# Patient Record
Sex: MALE | Race: OTHER | NOT HISPANIC OR LATINO
[De-identification: names, ages, dates, MRNs, and addresses within clinical notes are randomized per-mention and may not be internally consistent; named-entity substitution may affect disease eponyms.]

---

## 2017-02-12 PROBLEM — Z00.00 ENCOUNTER FOR PREVENTIVE HEALTH EXAMINATION: Status: ACTIVE | Noted: 2017-02-12

## 2017-02-13 ENCOUNTER — APPOINTMENT (OUTPATIENT)
Dept: HUMAN REPRODUCTION | Facility: CLINIC | Age: 32
End: 2017-02-13

## 2017-11-06 ENCOUNTER — EMERGENCY (EMERGENCY)
Facility: HOSPITAL | Age: 32
LOS: 1 days | Discharge: HOME | End: 2017-11-06
Attending: EMERGENCY MEDICINE | Admitting: EMERGENCY MEDICINE
Payer: SELF-PAY

## 2017-11-06 VITALS
SYSTOLIC BLOOD PRESSURE: 134 MMHG | RESPIRATION RATE: 18 BRPM | OXYGEN SATURATION: 98 % | HEART RATE: 70 BPM | DIASTOLIC BLOOD PRESSURE: 108 MMHG

## 2017-11-06 PROCEDURE — 99284 EMERGENCY DEPT VISIT MOD MDM: CPT | Mod: 25

## 2017-11-06 PROCEDURE — 99053 MED SERV 10PM-8AM 24 HR FAC: CPT

## 2017-11-06 PROCEDURE — 73564 X-RAY EXAM KNEE 4 OR MORE: CPT | Mod: 26,LT

## 2017-11-06 RX ORDER — ACETAMINOPHEN 500 MG
975 TABLET ORAL ONCE
Qty: 0 | Refills: 0 | Status: COMPLETED | OUTPATIENT
Start: 2017-11-06 | End: 2017-11-06

## 2017-11-06 RX ORDER — MORPHINE SULFATE 50 MG/1
4 CAPSULE, EXTENDED RELEASE ORAL ONCE
Qty: 0 | Refills: 0 | Status: DISCONTINUED | OUTPATIENT
Start: 2017-11-06 | End: 2017-11-06

## 2017-11-06 RX ORDER — SODIUM CHLORIDE 9 MG/ML
3 INJECTION INTRAMUSCULAR; INTRAVENOUS; SUBCUTANEOUS EVERY 8 HOURS
Qty: 0 | Refills: 0 | Status: DISCONTINUED | OUTPATIENT
Start: 2017-11-06 | End: 2017-11-11

## 2017-11-06 RX ORDER — OXYCODONE HYDROCHLORIDE 5 MG/1
5 TABLET ORAL ONCE
Qty: 0 | Refills: 0 | Status: DISCONTINUED | OUTPATIENT
Start: 2017-11-06 | End: 2017-11-06

## 2017-11-06 RX ADMIN — OXYCODONE HYDROCHLORIDE 5 MILLIGRAM(S): 5 TABLET ORAL at 22:53

## 2017-11-06 RX ADMIN — Medication 975 MILLIGRAM(S): at 22:53

## 2017-11-06 RX ADMIN — MORPHINE SULFATE 4 MILLIGRAM(S): 50 CAPSULE, EXTENDED RELEASE ORAL at 23:50

## 2017-11-06 NOTE — ED ADULT NURSE NOTE - DISCHARGE TEACHING
pt ambulated in ED with crutches, crutches use educated. splint care educated. pt refused wheelchair out. states he would like to go home and is comfortable with crutches. pt wife driving home. per wife she has people to help her get him out of car and into house. both pt and wife comfortable with discharge and would like to home.

## 2017-11-06 NOTE — ED PROVIDER NOTE - PHYSICAL EXAMINATION
**ATTENDING ADDENDUM (Dr. Moises Lagunas): I have reviewed and substantially contributed to the elements of the PE as documented above. I have directly performed an examination of this patient in conjunction with the other members (EM resident/PA/NP) of the patient care team. Of note, and in addition to the above, there is a good dorsalis pedis pulse in the left lower extremity. NO distal discoloration. POSITIVE tenderness and soft-tissue swelling with fluctuance **ATTENDING ADDENDUM (Dr. Moises Lagunas): I have reviewed and substantially contributed to the elements of the PE as documented above. I have directly performed an examination of this patient in conjunction with the other members (EM resident/PA/NP) of the patient care team. Of note, and in addition to the above, there is a good dorsalis pedis pulse in the left lower extremity. NO distal discoloration. POSITIVE tenderness and soft-tissue swelling with fluctuance. POSITIVE diminished range of motion (unable to flex knee, not able to raise extended leg off bed). NO laxity with valgus stress to joint. NO laxity with varus stress to joint. NO ballotment of patella.

## 2017-11-06 NOTE — ED PROVIDER NOTE - ATTENDING CONTRIBUTION TO CARE
**ATTENDING ADDENDUM (Dr. Moises Lagunas): I attest that I have directly examined this patient and reviewed and formulated the diagnostic and therapeutic management plan in collaboration with the advanced practitioner (NP, PA). Please see MDM note and remainder of EMR for findings from CC, HPI, ROS, and PE. ([place])

## 2017-11-06 NOTE — ED PROCEDURE NOTE - CPROC ED POST PROC CARE GUIDE1
Verbal/written post procedure instructions were given to patient/caregiver. Keep the cast/splint/dressing clean and dry./Verbal/written post procedure instructions were given to patient/caregiver./Instructed patient/caregiver to follow-up with primary care physician./Elevate the injured extremity as instructed.

## 2017-11-06 NOTE — ED PROVIDER NOTE - OBJECTIVE STATEMENT
32 y.o. male coming in with left knee pain.  Pt was playing basketball came down on his knee heard a po and fell to the ground.  Here with pain and inability to range the knee.  No other injury at this time. 32 y.o. male coming in with left knee pain.  Pt was playing basketball came down on his knee heard a po and fell to the ground.  Here with pain and inability to range the knee.  No other injury at this time.  **ATTENDING ADDENDUM (Dr. Moises Lagunas): I attest that I have directly examined this patient and elicited a comparable history of present illness and review of systems with my collaborating provider (NP/PA). I attest that I have made significant contributions to the documentation where necessary and as noted in the EMR. 32 y.o. male coming in with left knee pain.  Pt was playing basketball came down on his knee heard a po and fell to the ground.  Here with pain and inability to range the knee.  No other injury at this time.  **ATTENDING ADDENDUM (Dr. Moises Lagunas): I attest that I have directly examined this patient and elicited a comparable history of present illness and review of systems with my collaborating provider (NP/PA). I attest that I have made significant contributions to the documentation where necessary and as noted in the EMR. Of note, and in addition to the above, patient is a 32-year-old man who sustained injury and felt "pop" in his left knee while playing basketball. POSITIVE fell, without obvious head/neck/chest/abdominal/back trauma, but with inability to walk or weight bear on his left lower extremity. POSITIVE mild soft-tissue swelling; NO numbness, tingling, weakness, or paresthesias. POSITIVE limited range of motion secondary to pain. Here for evaluation. NO medications prior to arrival. VAS 3-4/10 (pain reported as 8-9/10). Wife reports that patient turned pale and got nauseous prior to arrival to ED.

## 2017-11-06 NOTE — ED PROCEDURE NOTE - PROCEDURE ADDITIONAL DETAILS
**ATTENDING ADDENDUM (Dr. Moises Lagunas): I was present during the key portions of the procedure and immediately available during the entire procedure. Agree with plan and management. Anticipatory guidance provided.

## 2017-11-06 NOTE — ED PROVIDER NOTE - EYES, MLM
**ATTENDING ADDENDUM (Dr. Moises Lagunas): Extraocular muscle movements intact. Clear corneas bilaterally, pupils equal and round.

## 2017-11-06 NOTE — ED PROVIDER NOTE - PLAN OF CARE
ATTENDING ADDENDUM (Dr. Moises Lagunas): Goals of care include resolution of emergent/urgent symptoms and concerns, and restoration to baseline level of homeostasis. ATTENDING ADDENDUM (Dr. Moises Lagunas): (1) anticipatory guidance provided  (2) rest  (3) outpatient follow-up with your primary care physician/provider (4) return if symptoms worsen, persist, or do not resolve (5) medications, if indicated, as prescribed (oxycodone, acetaminophen as directed) ATTENDING ADDENDUM (Dr. Moises Lagunas): (1) anticipatory guidance provided  (2) rest  (3) outpatient follow-up with your primary care physician/provider (4) return if symptoms worsen, persist, or do not resolve (5) medications, if indicated, as prescribed (oxycodone, acetaminophen as directed) (6) followup with orthopedics as directed

## 2017-11-06 NOTE — ED PROVIDER NOTE - PROGRESS NOTE DETAILS
**ATTENDING ADDENDUM (Dr. Moises Lagunas): patient serially evaluated throughout ED course. NO acute deterioration up to this time in the ED. XRs reviewed: POSITIVE tibial plateau fracture, not quadriceps tendon or patellar ligament injury/rupture, as initially suspected. Case reviewed with orthopedic resident. Patient will need referral to orthopedics for elective repair once soft-tissue swelling diminishes. Agrees with discharge home with close outpatient followup with orthopedics, crutches and non-weight bearing, analgesics, and bulky Kiser dressing with knee immobilizer. Anticipatory guidance provided. Will continue to observe and monitor closely. **ATTENDING ADDENDUM (Dr. Moises Lagunas): will place IV for analgesic administration for splinting (bulky Kiser dressing with knee immobilizer). Will continue to observe and monitor closely. **ATTENDING ADDENDUM (Dr. Moises Lagunas): patient serially evaluated throughout ED course. NO acute deterioration up to this time in the ED. Tolerated placement of bulky Kiser dressing and knee immobilizer. Able to demonstrate crutch use and non-weight bearing instructions. Anticipatory guidance provided. Patient to call Dr. Barr tomorrow in the AM for followup. Agree with discharge home with close outpatient followup with primary care physician/provider and with medications (if appropriate, if clinically indicated, and as prescribed, as noted in EMR).

## 2017-11-06 NOTE — ED PROVIDER NOTE - DIAGNOSTIC INTERPRETATION
**ATTENDING ADDENDUM (Dr. Moises Lagunas): Radiographs reviewed. Pertinent findings include: POSITIVE left lateral tibial plateau fracture with transverse fracture line noted across proximal tibia (reviewed with orthopedic resident).

## 2017-11-06 NOTE — ED PROCEDURE NOTE - NS ED PERI VASCULAR NEG
fingers/toes warm to touch/capillary refill time < 2 seconds/no paresthesia **ATTENDING ADDENDUM (Dr. Moises Lagunas): personally evaluated by me following dressing/knee immobilizer placement/capillary refill time < 2 seconds/fingers/toes warm to touch/no paresthesia/no cyanosis of extremity
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
## 2017-11-06 NOTE — ED PROVIDER NOTE - MUSCULOSKELETAL MINIMAL EXAM
RANGE OF MOTION LIMITED **ATTENDING ADDENDUM (Dr. Moises Lagunas): NO distal discoloration/motor intact/TENDERNESS/RANGE OF MOTION LIMITED

## 2017-11-06 NOTE — ED PROVIDER NOTE - CARE PLAN
Principal Discharge DX:	Tibial plateau fracture, left, closed, initial encounter  Goal:	ATTENDING ADDENDUM (Dr. Moises Lagunas): Goals of care include resolution of emergent/urgent symptoms and concerns, and restoration to baseline level of homeostasis.  Instructions for follow-up, activity and diet:	ATTENDING ADDENDUM (Dr. Moises Lagunas): (1) anticipatory guidance provided  (2) rest  (3) outpatient follow-up with your primary care physician/provider (4) return if symptoms worsen, persist, or do not resolve (5) medications, if indicated, as prescribed (oxycodone, acetaminophen as directed) Principal Discharge DX:	Tibial plateau fracture, left, closed, initial encounter  Goal:	ATTENDING ADDENDUM (Dr. Moises Lagunas): Goals of care include resolution of emergent/urgent symptoms and concerns, and restoration to baseline level of homeostasis.  Instructions for follow-up, activity and diet:	ATTENDING ADDENDUM (Dr. Moises Lagunas): (1) anticipatory guidance provided  (2) rest  (3) outpatient follow-up with your primary care physician/provider (4) return if symptoms worsen, persist, or do not resolve (5) medications, if indicated, as prescribed (oxycodone, acetaminophen as directed) (6) followup with orthopedics as directed

## 2017-11-06 NOTE — ED PROVIDER NOTE - MEDICAL DECISION MAKING DETAILS
**ATTENDING MEDICAL DECISION MAKING/SYNTHESIS (Dr. Moises Lagunas): I have reviewed the Chief Complaint, the HPI, the ROS, and have directly performed and confirmed the findings on the Physical Examination. I have reviewed the medical decision making with all providers, as applicable. The PROBLEM REPRESENTATION at this time is: 32-year-old man with acute left-sided knee and quadriceps pain s/p twist and fall mechanism while playing basketball. POSITIVE not able to weight bear or move left lower extremity at knee joint. NO distal discoloration. POSITIVE severe pain and tenderness. The MOST LIKELY DIAGNOSIS, and the LIST OF DIFFERENTIAL DIAGNOSES, includes (but is not limited to) the following: quadriceps tendon rupture, patellar tendon/ligament rupture, fracture, dislocation, vascular injury (NO evidence). The likelihood of each of these diagnoses has been appropriately considered in the context of this patient's presentation and my evaluation. PLAN: as described in EMR, including diagnostics, therapeutics and consultation as clinically warranted. I will continue to reevaluate the patient, including the results of all testing, and monitor response to therapy throughout the patient's course in the ED. **ATTENDING MEDICAL DECISION MAKING/SYNTHESIS (Dr. Moises Lagunas): I have reviewed the Chief Complaint, the HPI, the ROS, and have directly performed and confirmed the findings on the Physical Examination. I have reviewed the medical decision making with all providers, as applicable. The PROBLEM REPRESENTATION at this time is: 32-year-old man with acute left-sided knee (proximal tibia) and quadriceps pain s/p twist and fall mechanism while playing basketball. POSITIVE not able to weight bear or move left lower extremity at knee joint. NO distal discoloration. POSITIVE severe pain and tenderness. The MOST LIKELY DIAGNOSIS, and the LIST OF DIFFERENTIAL DIAGNOSES, includes (but is not limited to) the following: quadriceps tendon rupture, patellar tendon/ligament rupture, fracture e.g. tibial plateau or equivalent, dislocation, vascular injury (NO evidence). The likelihood of each of these diagnoses has been appropriately considered in the context of this patient's presentation and my evaluation. PLAN: as described in EMR, including diagnostics, therapeutics and consultation as clinically warranted. I will continue to reevaluate the patient, including the results of all testing, and monitor response to therapy throughout the patient's course in the ED.

## 2017-11-06 NOTE — ED PROVIDER NOTE - CONDUCTED A DETAILED DISCUSSION WITH PATIENT AND/OR GUARDIAN REGARDING, MDM
**ATTENDING ADDENDUM (Dr. Moises Lagunas): Anticipatory guidance provided./return to ED if symptoms worsen, persist or questions arise/radiology results/need for outpatient follow-up

## 2017-11-06 NOTE — ED PROCEDURE NOTE - CPROC ED TOLERANCE1
Patient tolerated procedure well. Patient tolerated procedure well./**ATTENDING ADDENDUM (Dr. Moises Lagunas): Anticipatory guidance provided.

## 2017-11-07 VITALS
RESPIRATION RATE: 18 BRPM | HEART RATE: 59 BPM | OXYGEN SATURATION: 98 % | SYSTOLIC BLOOD PRESSURE: 117 MMHG | DIASTOLIC BLOOD PRESSURE: 65 MMHG

## 2017-11-07 PROBLEM — Z00.00 ENCOUNTER FOR PREVENTIVE HEALTH EXAMINATION: Noted: 2017-11-07

## 2017-11-07 PROCEDURE — 99284 EMERGENCY DEPT VISIT MOD MDM: CPT | Mod: 25

## 2017-11-07 PROCEDURE — 96375 TX/PRO/DX INJ NEW DRUG ADDON: CPT

## 2017-11-07 PROCEDURE — 73564 X-RAY EXAM KNEE 4 OR MORE: CPT

## 2017-11-07 PROCEDURE — 96374 THER/PROPH/DIAG INJ IV PUSH: CPT

## 2017-11-07 RX ORDER — ONDANSETRON 8 MG/1
4 TABLET, FILM COATED ORAL ONCE
Qty: 0 | Refills: 0 | Status: COMPLETED | OUTPATIENT
Start: 2017-11-07 | End: 2017-11-07

## 2017-11-07 RX ADMIN — ONDANSETRON 4 MILLIGRAM(S): 8 TABLET, FILM COATED ORAL at 00:10

## 2017-11-09 ENCOUNTER — APPOINTMENT (OUTPATIENT)
Dept: ORTHOPEDIC SURGERY | Facility: CLINIC | Age: 32
End: 2017-11-09

## 2017-11-13 ENCOUNTER — APPOINTMENT (OUTPATIENT)
Dept: ORTHOPEDIC SURGERY | Facility: CLINIC | Age: 32
End: 2017-11-13
Payer: SELF-PAY

## 2017-11-13 VITALS — WEIGHT: 210 LBS | HEIGHT: 71 IN | BODY MASS INDEX: 29.4 KG/M2 | RESPIRATION RATE: 16 BRPM

## 2017-11-13 DIAGNOSIS — F19.90 OTHER PSYCHOACTIVE SUBSTANCE USE, UNSPECIFIED, UNCOMPLICATED: ICD-10-CM

## 2017-11-13 DIAGNOSIS — F17.200 NICOTINE DEPENDENCE, UNSPECIFIED, UNCOMPLICATED: ICD-10-CM

## 2017-11-13 DIAGNOSIS — Z78.9 OTHER SPECIFIED HEALTH STATUS: ICD-10-CM

## 2017-11-13 PROCEDURE — 99203 OFFICE O/P NEW LOW 30 MIN: CPT | Mod: 57

## 2017-11-13 PROCEDURE — 73590 X-RAY EXAM OF LOWER LEG: CPT | Mod: LT

## 2017-11-14 ENCOUNTER — APPOINTMENT (OUTPATIENT)
Dept: ORTHOPEDIC SURGERY | Facility: CLINIC | Age: 32
End: 2017-11-14

## 2017-11-14 ENCOUNTER — FORM ENCOUNTER (OUTPATIENT)
Age: 32
End: 2017-11-14

## 2017-11-14 ENCOUNTER — OUTPATIENT (OUTPATIENT)
Dept: OUTPATIENT SERVICES | Facility: HOSPITAL | Age: 32
LOS: 1 days | Discharge: HOME | End: 2017-11-14

## 2017-11-14 VITALS
DIASTOLIC BLOOD PRESSURE: 76 MMHG | TEMPERATURE: 98 F | RESPIRATION RATE: 18 BRPM | WEIGHT: 212.08 LBS | HEIGHT: 71 IN | HEART RATE: 67 BPM | SYSTOLIC BLOOD PRESSURE: 118 MMHG | OXYGEN SATURATION: 98 %

## 2017-11-14 VITALS
OXYGEN SATURATION: 98 % | RESPIRATION RATE: 18 BRPM | HEIGHT: 71 IN | HEART RATE: 67 BPM | DIASTOLIC BLOOD PRESSURE: 76 MMHG | SYSTOLIC BLOOD PRESSURE: 118 MMHG | TEMPERATURE: 98 F | WEIGHT: 212.08 LBS

## 2017-11-14 DIAGNOSIS — Z01.818 ENCOUNTER FOR OTHER PREPROCEDURAL EXAMINATION: ICD-10-CM

## 2017-11-14 DIAGNOSIS — S82.209A UNSPECIFIED FRACTURE OF SHAFT OF UNSPECIFIED TIBIA, INITIAL ENCOUNTER FOR CLOSED FRACTURE: ICD-10-CM

## 2017-11-14 LAB
ANION GAP SERPL CALC-SCNC: 10 MMOL/L — SIGNIFICANT CHANGE UP (ref 5–17)
BLD GP AB SCN SERPL QL: SIGNIFICANT CHANGE UP
BUN SERPL-MCNC: 22 MG/DL — SIGNIFICANT CHANGE UP (ref 7–23)
CALCIUM SERPL-MCNC: 9.5 MG/DL — SIGNIFICANT CHANGE UP (ref 8.5–10.1)
CHLORIDE SERPL-SCNC: 102 MMOL/L — SIGNIFICANT CHANGE UP (ref 96–108)
CO2 SERPL-SCNC: 29 MMOL/L — SIGNIFICANT CHANGE UP (ref 22–31)
CREAT SERPL-MCNC: 1.01 MG/DL — SIGNIFICANT CHANGE UP (ref 0.5–1.3)
GLUCOSE SERPL-MCNC: 101 MG/DL — HIGH (ref 70–99)
HCT VFR BLD CALC: 46.7 % — SIGNIFICANT CHANGE UP (ref 39–50)
HGB BLD-MCNC: 15.6 G/DL — SIGNIFICANT CHANGE UP (ref 13–17)
INR BLD: 1.17 RATIO — HIGH (ref 0.88–1.16)
MCHC RBC-ENTMCNC: 29.7 PG — SIGNIFICANT CHANGE UP (ref 27–34)
MCHC RBC-ENTMCNC: 33.5 GM/DL — SIGNIFICANT CHANGE UP (ref 32–36)
MCV RBC AUTO: 88.7 FL — SIGNIFICANT CHANGE UP (ref 80–100)
PLATELET # BLD AUTO: 127 K/UL — LOW (ref 150–400)
POTASSIUM SERPL-MCNC: 4.2 MMOL/L — SIGNIFICANT CHANGE UP (ref 3.5–5.3)
POTASSIUM SERPL-SCNC: 4.2 MMOL/L — SIGNIFICANT CHANGE UP (ref 3.5–5.3)
PROTHROM AB SERPL-ACNC: 12.8 SEC — HIGH (ref 9.8–12.7)
RBC # BLD: 5.26 M/UL — SIGNIFICANT CHANGE UP (ref 4.2–5.8)
RBC # FLD: 11.2 % — SIGNIFICANT CHANGE UP (ref 11–15)
SODIUM SERPL-SCNC: 141 MMOL/L — SIGNIFICANT CHANGE UP (ref 135–145)
WBC # BLD: 6.1 K/UL — SIGNIFICANT CHANGE UP (ref 3.8–10.5)
WBC # FLD AUTO: 6.1 K/UL — SIGNIFICANT CHANGE UP (ref 3.8–10.5)

## 2017-11-14 NOTE — H&P PST ADULT - HISTORY OF PRESENT ILLNESS
32 year old male, no significant PMH presents for presurgical evaluation. patient's left lower leg with brace on s/p tibia fracture on 11/6/17 while playing basketball.

## 2017-11-14 NOTE — H&P PST ADULT - NSANTHOSAYNRD_GEN_A_CORE
No. NADER screening performed.  STOP BANG Legend: 0-2 = LOW Risk; 3-4 = INTERMEDIATE Risk; 5-8 = HIGH Risk

## 2017-11-14 NOTE — H&P PST ADULT - ATTENDING COMMENTS
L minimally displace Schatzker 6 tibial plateau fracture indicated for operative fixation. All RBAs discussed. All questions answered. Informed consent obtained.

## 2017-11-15 ENCOUNTER — INPATIENT (INPATIENT)
Facility: HOSPITAL | Age: 32
LOS: 0 days | Discharge: HOME | End: 2017-11-16
Attending: INTERNAL MEDICINE | Admitting: INTERNAL MEDICINE
Payer: SELF-PAY

## 2017-11-15 ENCOUNTER — TRANSCRIPTION ENCOUNTER (OUTPATIENT)
Age: 32
End: 2017-11-15

## 2017-11-15 ENCOUNTER — APPOINTMENT (OUTPATIENT)
Dept: ORTHOPEDIC SURGERY | Facility: HOSPITAL | Age: 32
End: 2017-11-15

## 2017-11-15 VITALS
WEIGHT: 210.1 LBS | HEIGHT: 71 IN | RESPIRATION RATE: 18 BRPM | OXYGEN SATURATION: 98 % | DIASTOLIC BLOOD PRESSURE: 82 MMHG | TEMPERATURE: 99 F | SYSTOLIC BLOOD PRESSURE: 126 MMHG | HEART RATE: 69 BPM

## 2017-11-15 LAB — HBA1C BLD-MCNC: 5.5 % — SIGNIFICANT CHANGE UP (ref 4–5.6)

## 2017-11-15 PROCEDURE — 27536 TREAT KNEE FRACTURE: CPT | Mod: LT

## 2017-11-15 RX ORDER — OXYCODONE HYDROCHLORIDE 5 MG/1
10 TABLET ORAL EVERY 4 HOURS
Qty: 0 | Refills: 0 | Status: DISCONTINUED | OUTPATIENT
Start: 2017-11-15 | End: 2017-11-16

## 2017-11-15 RX ORDER — HYDROMORPHONE HYDROCHLORIDE 2 MG/ML
1 INJECTION INTRAMUSCULAR; INTRAVENOUS; SUBCUTANEOUS ONCE
Qty: 0 | Refills: 0 | Status: DISCONTINUED | OUTPATIENT
Start: 2017-11-15 | End: 2017-11-15

## 2017-11-15 RX ORDER — DIPHENHYDRAMINE HCL 50 MG
25 CAPSULE ORAL AT BEDTIME
Qty: 0 | Refills: 0 | Status: DISCONTINUED | OUTPATIENT
Start: 2017-11-15 | End: 2017-11-16

## 2017-11-15 RX ORDER — ACETAMINOPHEN 500 MG
1000 TABLET ORAL ONCE
Qty: 0 | Refills: 0 | Status: COMPLETED | OUTPATIENT
Start: 2017-11-15 | End: 2017-11-15

## 2017-11-15 RX ORDER — INFLUENZA VIRUS VACCINE 15; 15; 15; 15 UG/.5ML; UG/.5ML; UG/.5ML; UG/.5ML
0.5 SUSPENSION INTRAMUSCULAR ONCE
Qty: 0 | Refills: 0 | Status: COMPLETED | OUTPATIENT
Start: 2017-11-15 | End: 2017-11-16

## 2017-11-15 RX ORDER — ENOXAPARIN SODIUM 100 MG/ML
40 INJECTION SUBCUTANEOUS EVERY 24 HOURS
Qty: 0 | Refills: 0 | Status: DISCONTINUED | OUTPATIENT
Start: 2017-11-15 | End: 2017-11-16

## 2017-11-15 RX ORDER — OXYCODONE HYDROCHLORIDE 5 MG/1
5 TABLET ORAL EVERY 4 HOURS
Qty: 0 | Refills: 0 | Status: DISCONTINUED | OUTPATIENT
Start: 2017-11-15 | End: 2017-11-16

## 2017-11-15 RX ORDER — ASCORBIC ACID 60 MG
500 TABLET,CHEWABLE ORAL
Qty: 0 | Refills: 0 | Status: DISCONTINUED | OUTPATIENT
Start: 2017-11-15 | End: 2017-11-16

## 2017-11-15 RX ORDER — CEFAZOLIN SODIUM 1 G
2000 VIAL (EA) INJECTION EVERY 8 HOURS
Qty: 0 | Refills: 0 | Status: COMPLETED | OUTPATIENT
Start: 2017-11-15 | End: 2017-11-16

## 2017-11-15 RX ORDER — ACETAMINOPHEN 500 MG
1000 TABLET ORAL ONCE
Qty: 0 | Refills: 0 | Status: COMPLETED | OUTPATIENT
Start: 2017-11-15 | End: 2017-11-16

## 2017-11-15 RX ORDER — FERROUS SULFATE 325(65) MG
325 TABLET ORAL
Qty: 0 | Refills: 0 | Status: DISCONTINUED | OUTPATIENT
Start: 2017-11-15 | End: 2017-11-16

## 2017-11-15 RX ORDER — ONDANSETRON 8 MG/1
4 TABLET, FILM COATED ORAL EVERY 6 HOURS
Qty: 0 | Refills: 0 | Status: DISCONTINUED | OUTPATIENT
Start: 2017-11-15 | End: 2017-11-16

## 2017-11-15 RX ORDER — ACETAMINOPHEN 500 MG
650 TABLET ORAL EVERY 6 HOURS
Qty: 0 | Refills: 0 | Status: DISCONTINUED | OUTPATIENT
Start: 2017-11-15 | End: 2017-11-16

## 2017-11-15 RX ORDER — MAGNESIUM HYDROXIDE 400 MG/1
30 TABLET, CHEWABLE ORAL DAILY
Qty: 0 | Refills: 0 | Status: DISCONTINUED | OUTPATIENT
Start: 2017-11-15 | End: 2017-11-16

## 2017-11-15 RX ORDER — SODIUM CHLORIDE 9 MG/ML
1000 INJECTION, SOLUTION INTRAVENOUS
Qty: 0 | Refills: 0 | Status: DISCONTINUED | OUTPATIENT
Start: 2017-11-15 | End: 2017-11-15

## 2017-11-15 RX ORDER — DOCUSATE SODIUM 100 MG
100 CAPSULE ORAL THREE TIMES A DAY
Qty: 0 | Refills: 0 | Status: DISCONTINUED | OUTPATIENT
Start: 2017-11-15 | End: 2017-11-16

## 2017-11-15 RX ORDER — SODIUM CHLORIDE 9 MG/ML
1000 INJECTION INTRAMUSCULAR; INTRAVENOUS; SUBCUTANEOUS
Qty: 0 | Refills: 0 | Status: DISCONTINUED | OUTPATIENT
Start: 2017-11-15 | End: 2017-11-16

## 2017-11-15 RX ORDER — FOLIC ACID 0.8 MG
1 TABLET ORAL DAILY
Qty: 0 | Refills: 0 | Status: DISCONTINUED | OUTPATIENT
Start: 2017-11-15 | End: 2017-11-16

## 2017-11-15 RX ORDER — KETOROLAC TROMETHAMINE 30 MG/ML
30 SYRINGE (ML) INJECTION ONCE
Qty: 0 | Refills: 0 | Status: DISCONTINUED | OUTPATIENT
Start: 2017-11-15 | End: 2017-11-15

## 2017-11-15 RX ORDER — HYDROMORPHONE HYDROCHLORIDE 2 MG/ML
0.5 INJECTION INTRAMUSCULAR; INTRAVENOUS; SUBCUTANEOUS EVERY 4 HOURS
Qty: 0 | Refills: 0 | Status: DISCONTINUED | OUTPATIENT
Start: 2017-11-15 | End: 2017-11-16

## 2017-11-15 RX ADMIN — OXYCODONE HYDROCHLORIDE 10 MILLIGRAM(S): 5 TABLET ORAL at 18:32

## 2017-11-15 RX ADMIN — Medication 500 MILLIGRAM(S): at 19:09

## 2017-11-15 RX ADMIN — HYDROMORPHONE HYDROCHLORIDE 1 MILLIGRAM(S): 2 INJECTION INTRAMUSCULAR; INTRAVENOUS; SUBCUTANEOUS at 14:15

## 2017-11-15 RX ADMIN — Medication 30 MILLIGRAM(S): at 16:45

## 2017-11-15 RX ADMIN — SODIUM CHLORIDE 75 MILLILITER(S): 9 INJECTION, SOLUTION INTRAVENOUS at 10:08

## 2017-11-15 RX ADMIN — HYDROMORPHONE HYDROCHLORIDE 0.5 MILLIGRAM(S): 2 INJECTION INTRAMUSCULAR; INTRAVENOUS; SUBCUTANEOUS at 22:49

## 2017-11-15 RX ADMIN — Medication 1000 MILLIGRAM(S): at 15:09

## 2017-11-15 RX ADMIN — OXYCODONE HYDROCHLORIDE 10 MILLIGRAM(S): 5 TABLET ORAL at 19:32

## 2017-11-15 RX ADMIN — HYDROMORPHONE HYDROCHLORIDE 1 MILLIGRAM(S): 2 INJECTION INTRAMUSCULAR; INTRAVENOUS; SUBCUTANEOUS at 14:30

## 2017-11-15 RX ADMIN — HYDROMORPHONE HYDROCHLORIDE 0.5 MILLIGRAM(S): 2 INJECTION INTRAMUSCULAR; INTRAVENOUS; SUBCUTANEOUS at 23:04

## 2017-11-15 RX ADMIN — Medication 30 MILLIGRAM(S): at 16:27

## 2017-11-15 RX ADMIN — Medication 400 MILLIGRAM(S): at 15:07

## 2017-11-15 RX ADMIN — HYDROMORPHONE HYDROCHLORIDE 1 MILLIGRAM(S): 2 INJECTION INTRAMUSCULAR; INTRAVENOUS; SUBCUTANEOUS at 14:40

## 2017-11-15 RX ADMIN — Medication 100 MILLIGRAM(S): at 20:08

## 2017-11-15 RX ADMIN — HYDROMORPHONE HYDROCHLORIDE 1 MILLIGRAM(S): 2 INJECTION INTRAMUSCULAR; INTRAVENOUS; SUBCUTANEOUS at 14:50

## 2017-11-15 RX ADMIN — SODIUM CHLORIDE 75 MILLILITER(S): 9 INJECTION INTRAMUSCULAR; INTRAVENOUS; SUBCUTANEOUS at 18:34

## 2017-11-15 NOTE — BRIEF OPERATIVE NOTE - PRE-OP DX
Closed fracture of left tibial plateau with routine healing, subsequent encounter  11/15/2017    Active  [place]

## 2017-11-15 NOTE — BRIEF OPERATIVE NOTE - PROCEDURE
<<-----Click on this checkbox to enter Procedure Open reduction and internal fixation (ORIF) of tibial plateau using Synthes system  11/15/2017    Active  [facility]

## 2017-11-16 ENCOUNTER — TRANSCRIPTION ENCOUNTER (OUTPATIENT)
Age: 32
End: 2017-11-16

## 2017-11-16 VITALS — OXYGEN SATURATION: 98 % | HEART RATE: 75 BPM | DIASTOLIC BLOOD PRESSURE: 79 MMHG | SYSTOLIC BLOOD PRESSURE: 141 MMHG

## 2017-11-16 LAB
ANION GAP SERPL CALC-SCNC: 9 MMOL/L — SIGNIFICANT CHANGE UP (ref 5–17)
BASOPHILS # BLD AUTO: 0 K/UL — SIGNIFICANT CHANGE UP (ref 0–0.2)
BASOPHILS NFR BLD AUTO: 0.4 % — SIGNIFICANT CHANGE UP (ref 0–2)
BUN SERPL-MCNC: 13 MG/DL — SIGNIFICANT CHANGE UP (ref 7–23)
CALCIUM SERPL-MCNC: 8.6 MG/DL — SIGNIFICANT CHANGE UP (ref 8.5–10.1)
CHLORIDE SERPL-SCNC: 102 MMOL/L — SIGNIFICANT CHANGE UP (ref 96–108)
CO2 SERPL-SCNC: 26 MMOL/L — SIGNIFICANT CHANGE UP (ref 22–31)
CREAT SERPL-MCNC: 0.81 MG/DL — SIGNIFICANT CHANGE UP (ref 0.5–1.3)
EOSINOPHIL # BLD AUTO: 0 K/UL — SIGNIFICANT CHANGE UP (ref 0–0.5)
EOSINOPHIL NFR BLD AUTO: 0 % — SIGNIFICANT CHANGE UP (ref 0–6)
GLUCOSE SERPL-MCNC: 139 MG/DL — HIGH (ref 70–99)
HCT VFR BLD CALC: 41.4 % — SIGNIFICANT CHANGE UP (ref 39–50)
HGB BLD-MCNC: 14.2 G/DL — SIGNIFICANT CHANGE UP (ref 13–17)
LYMPHOCYTES # BLD AUTO: 1.1 K/UL — SIGNIFICANT CHANGE UP (ref 1–3.3)
LYMPHOCYTES # BLD AUTO: 9.7 % — LOW (ref 13–44)
MCHC RBC-ENTMCNC: 30.2 PG — SIGNIFICANT CHANGE UP (ref 27–34)
MCHC RBC-ENTMCNC: 34.2 GM/DL — SIGNIFICANT CHANGE UP (ref 32–36)
MCV RBC AUTO: 88.3 FL — SIGNIFICANT CHANGE UP (ref 80–100)
MONOCYTES # BLD AUTO: 1.3 K/UL — HIGH (ref 0–0.9)
MONOCYTES NFR BLD AUTO: 11.2 % — SIGNIFICANT CHANGE UP (ref 2–14)
NEUTROPHILS # BLD AUTO: 9 K/UL — HIGH (ref 1.8–7.4)
NEUTROPHILS NFR BLD AUTO: 78.6 % — HIGH (ref 43–77)
PLATELET # BLD AUTO: 173 K/UL — SIGNIFICANT CHANGE UP (ref 150–400)
POTASSIUM SERPL-MCNC: 3.7 MMOL/L — SIGNIFICANT CHANGE UP (ref 3.5–5.3)
POTASSIUM SERPL-SCNC: 3.7 MMOL/L — SIGNIFICANT CHANGE UP (ref 3.5–5.3)
RBC # BLD: 4.68 M/UL — SIGNIFICANT CHANGE UP (ref 4.2–5.8)
RBC # FLD: 10.9 % — LOW (ref 11–15)
SODIUM SERPL-SCNC: 137 MMOL/L — SIGNIFICANT CHANGE UP (ref 135–145)
WBC # BLD: 11.5 K/UL — HIGH (ref 3.8–10.5)
WBC # FLD AUTO: 11.5 K/UL — HIGH (ref 3.8–10.5)

## 2017-11-16 RX ORDER — ACETAMINOPHEN 500 MG
1000 TABLET ORAL ONCE
Qty: 0 | Refills: 0 | Status: COMPLETED | OUTPATIENT
Start: 2017-11-16 | End: 2017-11-16

## 2017-11-16 RX ORDER — ENOXAPARIN SODIUM 100 MG/ML
40 INJECTION SUBCUTANEOUS
Qty: 12 | Refills: 0 | OUTPATIENT
Start: 2017-11-16 | End: 2017-12-16

## 2017-11-16 RX ADMIN — INFLUENZA VIRUS VACCINE 0.5 MILLILITER(S): 15; 15; 15; 15 SUSPENSION INTRAMUSCULAR at 12:43

## 2017-11-16 RX ADMIN — OXYCODONE HYDROCHLORIDE 10 MILLIGRAM(S): 5 TABLET ORAL at 12:00

## 2017-11-16 RX ADMIN — Medication 325 MILLIGRAM(S): at 11:04

## 2017-11-16 RX ADMIN — OXYCODONE HYDROCHLORIDE 10 MILLIGRAM(S): 5 TABLET ORAL at 11:01

## 2017-11-16 RX ADMIN — Medication 500 MILLIGRAM(S): at 06:08

## 2017-11-16 RX ADMIN — ENOXAPARIN SODIUM 40 MILLIGRAM(S): 100 INJECTION SUBCUTANEOUS at 06:07

## 2017-11-16 RX ADMIN — Medication 1 TABLET(S): at 11:00

## 2017-11-16 RX ADMIN — OXYCODONE HYDROCHLORIDE 10 MILLIGRAM(S): 5 TABLET ORAL at 07:08

## 2017-11-16 RX ADMIN — Medication 1000 MILLIGRAM(S): at 01:30

## 2017-11-16 RX ADMIN — Medication 100 MILLIGRAM(S): at 06:08

## 2017-11-16 RX ADMIN — Medication 400 MILLIGRAM(S): at 01:13

## 2017-11-16 RX ADMIN — HYDROMORPHONE HYDROCHLORIDE 0.5 MILLIGRAM(S): 2 INJECTION INTRAMUSCULAR; INTRAVENOUS; SUBCUTANEOUS at 04:17

## 2017-11-16 RX ADMIN — OXYCODONE HYDROCHLORIDE 10 MILLIGRAM(S): 5 TABLET ORAL at 06:08

## 2017-11-16 RX ADMIN — Medication 1000 MILLIGRAM(S): at 08:55

## 2017-11-16 RX ADMIN — Medication 400 MILLIGRAM(S): at 08:32

## 2017-11-16 RX ADMIN — Medication 100 MILLIGRAM(S): at 04:17

## 2017-11-16 RX ADMIN — OXYCODONE HYDROCHLORIDE 10 MILLIGRAM(S): 5 TABLET ORAL at 01:05

## 2017-11-16 RX ADMIN — Medication 100 MILLIGRAM(S): at 14:21

## 2017-11-16 RX ADMIN — HYDROMORPHONE HYDROCHLORIDE 0.5 MILLIGRAM(S): 2 INJECTION INTRAMUSCULAR; INTRAVENOUS; SUBCUTANEOUS at 04:32

## 2017-11-16 RX ADMIN — OXYCODONE HYDROCHLORIDE 10 MILLIGRAM(S): 5 TABLET ORAL at 00:05

## 2017-11-16 RX ADMIN — Medication 325 MILLIGRAM(S): at 07:54

## 2017-11-16 RX ADMIN — Medication 1 MILLIGRAM(S): at 11:00

## 2017-11-16 NOTE — PROGRESS NOTE ADULT - ASSESSMENT
A/P: 32M s/p ORIF of Left Tibial Plateau POD 1  Pain Control  DVT ppx  NWB In [place]  PT/OT  Incentive Spirometry  DC planning A/P: 32M s/p ORIF of Left Tibial Plateau POD 1  Pain Control  DVT ppx  NWB In [place] [facility]  PT/OT  Incentive Spirometry  DC planning

## 2017-11-16 NOTE — PHYSICAL THERAPY INITIAL EVALUATION ADULT - MODIFIED CLINICAL TEST OF SENSORY INTEGRATION IN BALANCE TEST
Barthel Index: Feeding Score _10__, Bathing Score _0__, Grooming Score _0__, Dressing Score _5_, Bowels Score _0__, Bladder Score _5_, Toilet Score _5__, Transfers Score _10__, Mobility Score _10__, Stairs Score _5__,     Total Score __50_

## 2017-11-16 NOTE — PHYSICAL THERAPY INITIAL EVALUATION ADULT - ADDITIONAL COMMENTS
As per patient and wife, lives in private house c 4 stair steps to enter and about 10 stair steps to bedroom. Has bathroom/toilet facility on both floors. As per patient and wife, lives in private house c 4 stair steps to enter and about 10 stair steps to bedroom. Has bathroom/toilet facility on both floors. Used a pair of axillary crutch prior to surgery, able to negotiate around house.

## 2017-11-16 NOTE — PHYSICAL THERAPY INITIAL EVALUATION ADULT - PERTINENT HX OF CURRENT PROBLEM, REHAB EVAL
Patient came in following sporting related left tibial plateau fracture. POD 1 ORIF to left lower limb. NWB ordered for mobility c [place] brace.

## 2017-11-16 NOTE — DISCHARGE NOTE ADULT - MEDICATION SUMMARY - MEDICATIONS TO TAKE
I will START or STAY ON the medications listed below when I get home from the hospital:    Hinged Knee Brace   -- 1 dose(s) epidural  Hinged knee brace 0-90 degrees   -- Indication: For Ambulation, Locked in Extension    Percocet 5/325 oral tablet  -- 1 tab(s) by mouth every 4 hours, As Needed for pain MDD:6  -- Caution federal law prohibits the transfer of this drug to any person other  than the person for whom it was prescribed.  May cause drowsiness.  Alcohol may intensify this effect.  Use care when operating dangerous machinery.  This prescription cannot be refilled.  This product contains acetaminophen.  Do not use  with any other product containing acetaminophen to prevent possible liver damage.  Using more of this medication than prescribed may cause serious breathing problems.    -- Indication: For Pain    Lovenox 40 mg/0.4 mL injectable solution  -- 40 milligram(s) injectable once a day do not skip doses  -- It is very important that you take or use this exactly as directed.  Do not skip doses or discontinue unless directed by your doctor.    -- Indication: For DVT prophylaxis

## 2017-11-16 NOTE — DISCHARGE NOTE ADULT - MEDICATION SUMMARY - MEDICATIONS TO STOP TAKING
I will STOP taking the medications listed below when I get home from the hospital:    oxyCODONE-acetaminophen 5 mg-325 mg oral tablet  -- 1 tab(s) by mouth every 6 hours as needed for pain MDD:4  -- Caution federal law prohibits the transfer of this drug to any person other  than the person for whom it was prescribed.  May cause drowsiness.  Alcohol may intensify this effect.  Use care when operating dangerous machinery.  This prescription cannot be refilled.  This product contains acetaminophen.  Do not use  with any other product containing acetaminophen to prevent possible liver damage.  Using more of this medication than prescribed may cause serious breathing problems.

## 2017-11-16 NOTE — PHYSICAL THERAPY INITIAL EVALUATION ADULT - CRITERIA FOR SKILLED THERAPEUTIC INTERVENTIONS
anticipated equipment needs at discharge/risk reduction/prevention/functional limitations in following categories/impairments found/anticipated discharge recommendation/rehab potential

## 2017-11-16 NOTE — DISCHARGE NOTE ADULT - PATIENT PORTAL LINK FT
“You can access the FollowHealth Patient Portal, offered by [facility], by registering with the following website: [email or web address]”

## 2017-11-16 NOTE — PHYSICAL THERAPY INITIAL EVALUATION ADULT - IMPAIRMENTS FOUND, PT EVAL
integumentary integrity/ROM/neuromotor development and sensory integration/poor safety awareness/aerobic capacity/endurance/ergonomics and body mechanics/muscle strength/gross motor/joint integrity and mobility/posture/gait, locomotion, and balance

## 2017-11-16 NOTE — DISCHARGE NOTE ADULT - ADDITIONAL INSTRUCTIONS
Follow up with Dr. Cui as outpatient in 1 week after discharge from the hospital or rehab. Call office for appointment [phone].

## 2017-11-16 NOTE — DISCHARGE NOTE ADULT - PLAN OF CARE
Return to Baseline ADLs s/p ORIF 1. Pain Control  2. Non-Weight Bearing Left Lower Extremity with assistive devices (walker/cane) as needed in a [place] Brace  3. DVT Prophylaxis for 30 days  4. Physical Therapy  5. Follow up with Dr. Cui as outpatient in 1 week after discharge from the hospital or rehab. Call office for appointment [phone].  6. Staples/sutures to be removed Post-Op Day 14, and repeat x-rays as needed.  7. Ice/Elevate affected area as needed.  8. Keep dressing clean and dry

## 2017-11-16 NOTE — PHYSICAL THERAPY INITIAL EVALUATION ADULT - GENERAL OBSERVATIONS, REHAB EVAL
Patient encountered supine in bed, vital signs as charted. Attachments: left lower limb ace wrapped, awaiting Lexi brace, as per ortho order. Confirmed c Ortho Resident Chan by pager [phone], that may use knee immobilizer while awaiting brace to be delivered by bedside. AAOx4. Reports pain at rest.

## 2017-11-16 NOTE — PHYSICAL THERAPY INITIAL EVALUATION ADULT - PLANNED THERAPY INTERVENTIONS, PT EVAL
bed mobility training/gait training/joint mobilization/postural re-education/stretching/balance training/strengthening/neuromuscular re-education/ROM

## 2017-11-16 NOTE — DISCHARGE NOTE ADULT - HOSPITAL COURSE
The patient is a 32 year old male status post Open Reduction Surgical Fixation of a left tibial plateau Fracture. The Patient was medically Optimized for the Previously mentioned surgical procedure. The patient was taken to the operating room on date mentioned above. Prophylactic antibiotics were started before the procedure and continued for 24 hours.  There were no complications during the procedure and patient tolerated the procedure well.  The patient was transferred to recovery room in stable condition and subsequently to surgical floor.  Patient was placed on Lovenox for anticoagulation.  All home medications were continued.  The patient received physical therapy daily and daily labs were followed.  The dressing was kept clean, dry, intact and changed appropriately. The rest of the hospital stay was unremarkable.

## 2017-11-16 NOTE — DISCHARGE NOTE ADULT - CARE PLAN
Principal Discharge DX:	Closed fracture of left tibial plateau, initial encounter  Goal:	Return to Baseline ADLs s/p ORIF  Instructions for follow-up, activity and diet:	1. Pain Control  2. Non-Weight Bearing Left Lower Extremity with assistive devices (walker/cane) as needed in a Lexi Brace  3. DVT Prophylaxis for 30 days  4. Physical Therapy  5. Follow up with Dr. Cui as outpatient in 1 week after discharge from the hospital or rehab. Call office for appointment [phone].  6. Staples/sutures to be removed Post-Op Day 14, and repeat x-rays as needed.  7. Ice/Elevate affected area as needed.  8. Keep dressing clean and dry

## 2017-11-16 NOTE — PROGRESS NOTE ADULT - SUBJECTIVE AND OBJECTIVE BOX
Pt S/E at bedside, no acute events overnight, pain controlled    Vital Signs Last 24 Hrs  T(C): 37.3 (16 Nov 2017 04:20), Max: 37.3 (16 Nov 2017 04:20)  T(F): 99.2 (16 Nov 2017 04:20), Max: 99.2 (16 Nov 2017 04:20)  HR: 73 (16 Nov 2017 04:20) (49 - 84)  BP: 140/77 (16 Nov 2017 04:20) (126/82 - 144/88)  RR: 16 (16 Nov 2017 04:20) (14 - 21)  SpO2: 97% (16 Nov 2017 04:20) (95% - 100%)    Gen: NAD,     Left Lower Extremity:  Dressing clean dry intact  +EHL/FHL/TA/GS  SILT L3-S1  +DP/PT Pulses  Compartments soft  No calf TTP B/L

## 2017-11-20 ENCOUNTER — APPOINTMENT (OUTPATIENT)
Dept: ORTHOPEDIC SURGERY | Facility: CLINIC | Age: 32
End: 2017-11-20
Payer: SELF-PAY

## 2017-11-20 DIAGNOSIS — S82.142A DISPLACED BICONDYLAR FRACTURE OF LEFT TIBIA, INITIAL ENCOUNTER FOR CLOSED FRACTURE: ICD-10-CM

## 2017-11-20 DIAGNOSIS — F17.210 NICOTINE DEPENDENCE, CIGARETTES, UNCOMPLICATED: ICD-10-CM
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
## 2017-11-20 DIAGNOSIS — F12.10 CANNABIS ABUSE, UNCOMPLICATED: ICD-10-CM

## 2017-11-20 DIAGNOSIS — S83.204A OTHER TEAR OF UNSPECIFIED MENISCUS, CURRENT INJURY, LEFT KNEE, INITIAL ENCOUNTER: ICD-10-CM

## 2017-11-20 PROCEDURE — 73564 X-RAY EXAM KNEE 4 OR MORE: CPT | Mod: LT

## 2017-11-20 PROCEDURE — 73590 X-RAY EXAM OF LOWER LEG: CPT

## 2017-11-20 PROCEDURE — 99024 POSTOP FOLLOW-UP VISIT: CPT

## 2017-11-20 RX ORDER — OXYCODONE HYDROCHLORIDE 30 MG/1
TABLET ORAL
Refills: 0 | Status: ACTIVE | COMMUNITY

## 2017-11-20 RX ORDER — ACETAMINOPHEN 325 MG/1
TABLET, FILM COATED ORAL
Refills: 0 | Status: ACTIVE | COMMUNITY

## 2017-12-22 ENCOUNTER — APPOINTMENT (OUTPATIENT)
Dept: ORTHOPEDIC SURGERY | Facility: CLINIC | Age: 32
End: 2017-12-22
Payer: SELF-PAY

## 2017-12-22 PROCEDURE — 73564 X-RAY EXAM KNEE 4 OR MORE: CPT | Mod: LT

## 2017-12-22 PROCEDURE — 99024 POSTOP FOLLOW-UP VISIT: CPT

## 2018-02-05 ENCOUNTER — APPOINTMENT (OUTPATIENT)
Dept: ORTHOPEDIC SURGERY | Facility: CLINIC | Age: 33
End: 2018-02-05

## 2018-02-12 ENCOUNTER — APPOINTMENT (OUTPATIENT)
Dept: ORTHOPEDIC SURGERY | Facility: CLINIC | Age: 33
End: 2018-02-12
Payer: COMMERCIAL

## 2018-02-12 DIAGNOSIS — S82.142A DISPLACED BICONDYLAR FRACTURE OF LEFT TIBIA, INITIAL ENCOUNTER FOR CLOSED FRACTURE: ICD-10-CM

## 2018-02-12 PROCEDURE — 73560 X-RAY EXAM OF KNEE 1 OR 2: CPT | Mod: LT

## 2018-02-12 PROCEDURE — 99024 POSTOP FOLLOW-UP VISIT: CPT

## 2018-03-26 ENCOUNTER — APPOINTMENT (OUTPATIENT)
Dept: ORTHOPEDIC SURGERY | Facility: CLINIC | Age: 33
End: 2018-03-26

## 2018-04-09 NOTE — PATIENT PROFILE ADULT. - EXTENSIONS OF SELF_ADULT
-Continue atovaquone for PJP and toxoplasmosis prophylaxis  -Continue valganciclovir for CMV prophylaxis (intermediate risk). Will adjust dosing given renal dysfunction.   -Continue nystatin for thrush prophylaxis -Continue atovaquone for PJP and toxoplasmosis prophylaxis  -Creatinine clearance 57.7. Continue valganciclovir 450mg once daily for CMV prophylaxis (intermediate risk).   -Continue nystatin for thrush prophylaxis None

## 2018-07-27 PROBLEM — Z78.9 ALCOHOL USE: Status: ACTIVE | Noted: 2017-11-13

## 2018-12-13 NOTE — ED ADULT NURSE NOTE - LOCATION
Left voicemail requesting a call back. Specialty pharmacy has 2 addresses in their chart and will be sending medication to address   [address] [place] [address].  
knee

## 2021-03-16 ENCOUNTER — EMERGENCY (EMERGENCY)
Facility: HOSPITAL | Age: 36
LOS: 1 days | Discharge: HOME | End: 2021-03-16
Payer: MEDICAID

## 2021-03-16 VITALS
WEIGHT: 214.95 LBS | TEMPERATURE: 98 F | HEIGHT: 71 IN | RESPIRATION RATE: 17 BRPM | OXYGEN SATURATION: 97 % | DIASTOLIC BLOOD PRESSURE: 80 MMHG | SYSTOLIC BLOOD PRESSURE: 121 MMHG | HEART RATE: 72 BPM

## 2021-03-16 PROCEDURE — 99283 EMERGENCY DEPT VISIT LOW MDM: CPT | Mod: 25

## 2021-03-16 PROCEDURE — 12001 RPR S/N/AX/GEN/TRNK 2.5CM/<: CPT

## 2021-03-17 PROCEDURE — 90471 IMMUNIZATION ADMIN: CPT

## 2021-03-17 PROCEDURE — 12002 RPR S/N/AX/GEN/TRNK2.6-7.5CM: CPT

## 2021-03-17 PROCEDURE — 99283 EMERGENCY DEPT VISIT LOW MDM: CPT | Mod: 25

## 2021-03-17 PROCEDURE — 90715 TDAP VACCINE 7 YRS/> IM: CPT

## 2021-03-17 RX ORDER — TETANUS TOXOID, REDUCED DIPHTHERIA TOXOID AND ACELLULAR PERTUSSIS VACCINE, ADSORBED 5; 2.5; 8; 8; 2.5 [IU]/.5ML; [IU]/.5ML; UG/.5ML; UG/.5ML; UG/.5ML
0.5 SUSPENSION INTRAMUSCULAR ONCE
Refills: 0 | Status: COMPLETED | OUTPATIENT
Start: 2021-03-17 | End: 2021-03-17

## 2021-03-17 RX ADMIN — TETANUS TOXOID, REDUCED DIPHTHERIA TOXOID AND ACELLULAR PERTUSSIS VACCINE, ADSORBED 0.5 MILLILITER(S): 5; 2.5; 8; 8; 2.5 SUSPENSION INTRAMUSCULAR at 00:53

## 2021-03-17 NOTE — ED PROVIDER NOTE - PATIENT PORTAL LINK FT
You can access the FollowMyHealth Patient Portal offered by [facility] by registering at the following website: [email or web address]. By joining [facility]’s FollowMyHealth portal, you will also be able to view your health information using other applications (apps) compatible with our system.

## 2021-03-17 NOTE — ED PROVIDER NOTE - NS_ ATTENDINGSCRIBEDETAILS _ED_A_ED_FT
MD Christensen:  The scribe's documentation has been prepared under my direction and personally reviewed by me in its entirety. I confirm that the note above accurately reflects all work, treatment, procedures, and medical decision making performed by me.  MD Christensen:  see the MDM & progress notes for my I&P.

## 2021-03-17 NOTE — ED PROVIDER NOTE - NSFOLLOWUPINSTRUCTIONS_ED_ALL_ED_FT
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
## 2021-03-17 NOTE — ED PROVIDER NOTE - PHYSICAL EXAMINATION
GEN: NAD, awake, eyes open spontaneously  ENT: NCAT, MMM, Trachea midline  CHEST/LUNGS: Non-tachypneic, CTAB, bilateral breath sounds  CARDIAC: Non-tachycardic, normal perfusion  ABDOMEN: Soft, NTND, No rebound/guarding  MSK: Pt endorses mild pain in the area of lac with wrist extension but strength 5/5   SKIN: 3cm lac over the ulnar aspect of the distal left forearm. Full strength on wrist extension. Abduction of the little finger and extension of the little finger intact. Sensation intact to light touch.
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
## 2021-11-19 NOTE — ED PROCEDURE NOTE - NS ED PROCEDURE ASSISTED BY
shortness of breath shortness of breath shortness of breath shortness of breath Supervision was available

## 2022-01-25 NOTE — BRIEF OPERATIVE NOTE - PROCEDURE POST
<<-----Click on this checkbox to enter Post-Op Dx
How Severe Is Your Skin Lesion?: mild
Is This A New Presentation, Or A Follow-Up?: Skin Lesion
Is This A New Presentation, Or A Follow-Up?: Growth

## 2022-02-01 NOTE — PATIENT PROFILE ADULT. - MEDICATION HERBAL REMEDIES, PROFILE
SIDE: Right    Date of Surgery: 05/04/2018    Location: [place], [place]    Surgeon: Ben Marquez M.D.       Assistant: None    Anesthesia: Regional with General endotracheal anesthesia    Pre-operative Diagnosis                          1. Right displaced extra-articular distal radius fracture  2. Right post-traumatic carpal tunnel syndrome      Post Operative Diagnosis  1. Right displaced extra-articular distal radius fracture  2. Right post-traumatic carpal tunnel syndrome    Operative Procedure         1. Right open reduction with internal fixation of displaced extra-articular distal radius fracture  2. Right carpal tunnel release    Intraoperative Complications: None    Estimated Blood Loss: Minimal    Tourniquet time: See nursing/anesthesia records    Drain: no    Specimen: none    Fixation Technique  ORIF    Intraoperative findings:   Displaced extra-articular distal radius fracture    Components:  [employer] wide 4-hole volar distal radius locking plate      Time out: Time out was called and the patient, side, site, and intended procedure was confirmed.    Counts: Needle, lap sponge, and ray-amena sponge counts were correct prior to closure.    Marked: The patient was marked with an indelible marker in the preoperative holding area confirming the correct site and side.    Consent: The patient signed the consent form for surgery with an understanding of the risks and benefits as outlined at the time of consultation and in the preoperative holding area.    Risks and Benefits:   Need for blood transfusion; medical complications with anesthesia/surgery including deep venous thrombosis, pulmonary embolus, stroke, myocardial infarction, and death; potential block complications if block performed; infection; revision surgery; malunion/nonunion; fracture; failure of hardware; nerve or blood vessel injury; incomplete pain relief; stiffness; lack of wound healing; and possible  future hardware removal    Indications for surgery: The patient suffered an acute displaced extra-articular distal radius fracture with associated acute carpal tunnel symptoms      Procedure in Detail:   After placement of a regional nerve block in the preoperative holding area, the patient was taken to the operating room and placed supine on the table. Preoperative antibiotics were given within 30 minutes of incision. The extremity was prepped and draped sterilely. A formal timeout was completed prior to initiation of the procedure. After exsanguination the pneumatic arm tourniquet was inflated to 250 mm mercury. A longitudinal incision was made on the volar aspect of distal forearm extending proximally in line with the FCR tendon. The subcutaneous tissue was divided achieving hemostasis along the way with bipolar electrocautery. The FCR tendon sheath was opened and the tendon was reracted. The deep forearm fascia was incised longitudinally being careful to avoid damage to the palmar cutaneous branch of the median nerve. The FPL muscle was retracted. The radial insertion of the pronator quadratus muscle was divided leaving a small cuff of muscle for later repair. The muscle was elevated and retracted ulnarly to expose the fracture. The brachial radialis was released to aid in reduction. The fracture site was cleaned of soft tissue. The fracture was found to complete posterior translation with mild volar comminution without obvious intra-articular extension. The volar cortex of the fracture was then reduced. Following reduction of the fracture, it was temporarily held in place with placement of two K-wires. The sterilely draped C-arm was used to verify satisfactory reduction in the AP and lateral planes. The plate was applied to the anterior surface of the distal radius and provisionally held with K wires. Again the C-arm was used to verify satisfactory reduction and good position of the plate. The plate was then  General applied using standard AO technique by first drilling and measuring for length. Locking screws were placed in the distal rows of the plate and combination of locking and cortical screws were placed through the proximal shaft of the plate. Following placement of the screws the fracture was judged to be stable as the wrist was taken through full range of motion. The C-arm again verified satisfactory reduction of the fracture and good placement of the hardware. The wrist was taken through range of motion while visualizing it with the C-arm without evidence of fracture displacement. There did not appear to be any screw penetration into the joint. The DRUJ was assessed and found to be stable on exam.     Next, attention was turned towards the carpal tunnel release.  A longitundial incision was made over the carpal canal in line with a palmar crease along the radial border of the ring finger.  Blunt dissection was carried down to the underlying palmar fascia which was longitudinally split in line with the incision allowing for exposure of the transverse carpal ligament.  The knife blade was used to penetrate the transverse carpal ligament exposing the underlying carpal canal.  Under direct visualization the transverse carpal ligament was first released to its distal extent with identification of the palmar fat indicating complete distal release.  The remaining proximal portion of the transverse carpal ligament was then released again under direct visualization completing the carpal tunnel release.  The median nerve was inspected and found to be healthy in appearance without significant hyperemia and was free and clear of all compression at the conclusion of the procedure.  The carpal canal was inspected and there was a small hematoma, which was irrigated, but no additional pathology was noted.  This completed the surgical procedure. The wounds were irrigated with saline. The pronator fascia was repaired with Vicryl  suture. The subcutaneous tissue and skin were closed in separate layers with nylon closure of the skin.  The carpal tunnel wound was closed with nylon closure of the skin. A sterile bandage and well padded volar wrist splint were applied. The tourniquet was deflated with immediate reperfusion of the hand and digits. The patient was taken to the recovery area in satisfactory condition.        Postoperative Plan:    Activity: Non-weight bearing operative extremity, sling until nerve block wears off. Keep the splint clean, dry, and intact at all times. Hand elevation above the heart and elbow at all times while at rest.    OT: Patient to begin working with hand therapy in 1 week transistion to a removable wrist splint and initiation of motion, but no weightbearing/axial loading.    Follow-up in clinic: 2 weeks for x-rays on arrival at clinic               no

## 2022-02-17 ENCOUNTER — APPOINTMENT (OUTPATIENT)
Dept: ORTHOPEDIC SURGERY | Facility: CLINIC | Age: 37
End: 2022-02-17

## 2022-10-26 ENCOUNTER — APPOINTMENT (OUTPATIENT)
Dept: ORTHOPEDIC SURGERY | Facility: CLINIC | Age: 37
End: 2022-10-26

## 2023-02-01 NOTE — ED PROVIDER NOTE - CLINICAL SUMMARY MEDICAL DECISION MAKING FREE TEXT BOX
01-Feb-2023 10:41
Distal forearm lac. Possible partial tendon injury but function is completely intact. Plan: wound care and repair. DC home with return precautions and suture removal in 7-10 days.

## 2023-02-13 NOTE — ED ADULT TRIAGE NOTE - NS ED TRIAGE AVPU SCALE
Alert-The patient is alert, awake and responds to voice. The patient is oriented to time, place, and person. The triage nurse is able to obtain subjective information.
13-Feb-2023 19:28

## 2024-03-26 NOTE — ED PROVIDER NOTE - NS_ATTENDINGSCRIBE_ED_ALL_ED
I personally performed the service described in the documentation recorded by the scribe in my presence, and it accurately and completely records my words and actions.
Pt with back pain with normal exam but still with pain despite small improvement- d/w pt for CDU for pain control and she agrees

## 2024-07-23 NOTE — ED PROCEDURE NOTE - NS ED PERI NEURO NEG
The patient/caregiver verbalized understanding of how to care for the injured extremity with splint/Pre-application: Motor, sensory, and vascular responses intact in the injured extremity./Post-application: Motor, sensory, and vascular responses intact in the injured extremity.
Yes